# Patient Record
Sex: FEMALE | Race: WHITE | NOT HISPANIC OR LATINO | URBAN - METROPOLITAN AREA
[De-identification: names, ages, dates, MRNs, and addresses within clinical notes are randomized per-mention and may not be internally consistent; named-entity substitution may affect disease eponyms.]

---

## 2018-05-14 ENCOUNTER — IMPORTED ENCOUNTER (OUTPATIENT)
Dept: URBAN - METROPOLITAN AREA CLINIC 50 | Facility: CLINIC | Age: 48
End: 2018-05-14

## 2018-05-23 ENCOUNTER — IMPORTED ENCOUNTER (OUTPATIENT)
Dept: URBAN - METROPOLITAN AREA CLINIC 50 | Facility: CLINIC | Age: 48
End: 2018-05-23

## 2018-06-15 ENCOUNTER — IMPORTED ENCOUNTER (OUTPATIENT)
Dept: URBAN - METROPOLITAN AREA CLINIC 50 | Facility: CLINIC | Age: 48
End: 2018-06-15

## 2018-06-29 ENCOUNTER — IMPORTED ENCOUNTER (OUTPATIENT)
Dept: URBAN - METROPOLITAN AREA CLINIC 50 | Facility: CLINIC | Age: 48
End: 2018-06-29

## 2018-07-17 ENCOUNTER — IMPORTED ENCOUNTER (OUTPATIENT)
Dept: URBAN - METROPOLITAN AREA CLINIC 50 | Facility: CLINIC | Age: 48
End: 2018-07-17

## 2018-08-01 ENCOUNTER — IMPORTED ENCOUNTER (OUTPATIENT)
Dept: URBAN - METROPOLITAN AREA CLINIC 50 | Facility: CLINIC | Age: 48
End: 2018-08-01

## 2018-08-01 NOTE — PATIENT DISCUSSION
"""Pt. aware reading may be a little worse and knows will need +1.00 OTC reader over CTL'sTrials given to patient today. Patient to call with progress to finalize prescription.  Instructed patient to discontinue contact lens wear and call office immediately if pain/discomfort

## 2018-08-01 NOTE — PATIENT DISCUSSION
"""Pt. aware reading may be a little worse and knows will need +1.00 OTC reader over CTL'sPatient given final contact lens prescription.  8/1/2018"""

## 2018-08-17 ENCOUNTER — IMPORTED ENCOUNTER (OUTPATIENT)
Dept: URBAN - METROPOLITAN AREA CLINIC 50 | Facility: CLINIC | Age: 48
End: 2018-08-17

## 2018-12-07 ENCOUNTER — IMPORTED ENCOUNTER (OUTPATIENT)
Dept: URBAN - METROPOLITAN AREA CLINIC 50 | Facility: CLINIC | Age: 48
End: 2018-12-07

## 2018-12-07 NOTE — PATIENT DISCUSSION
"""CL check in Jan. Pt to work on getting dry eye under control before finalizing a new CL Rx. Discussed possible mini mono.  """

## 2019-01-31 ENCOUNTER — IMPORTED ENCOUNTER (OUTPATIENT)
Dept: URBAN - METROPOLITAN AREA CLINIC 50 | Facility: CLINIC | Age: 49
End: 2019-01-31

## 2020-04-22 ENCOUNTER — IMPORTED ENCOUNTER (OUTPATIENT)
Dept: URBAN - METROPOLITAN AREA CLINIC 50 | Facility: CLINIC | Age: 50
End: 2020-04-22

## 2020-06-05 ENCOUNTER — IMPORTED ENCOUNTER (OUTPATIENT)
Dept: URBAN - METROPOLITAN AREA CLINIC 50 | Facility: CLINIC | Age: 50
End: 2020-06-05

## 2020-06-05 NOTE — PATIENT DISCUSSION
"""Patient defered refraction and dilation.  Was unable to over refract due to patient having to leave ""

## 2020-06-30 ENCOUNTER — IMPORTED ENCOUNTER (OUTPATIENT)
Dept: URBAN - METROPOLITAN AREA CLINIC 50 | Facility: CLINIC | Age: 50
End: 2020-06-30

## 2020-07-08 ENCOUNTER — IMPORTED ENCOUNTER (OUTPATIENT)
Dept: URBAN - METROPOLITAN AREA CLINIC 50 | Facility: CLINIC | Age: 50
End: 2020-07-08

## 2021-05-14 ASSESSMENT — VISUAL ACUITY
OS_CC: 20/40
OD_CC: 20/20-1
OS_CC: 20/25-
OD_CC: 20/20
OS_CC: 20/20-
OS_CC: J3
OD_CC: J1+
OS_CC: 20/25
OS_CC: J1+
OS_PH: 20/20
OD_CC: J1(-)
OS_CC: 20/25
OD_CC: 20/50
OS_CC: J1+
OD_CC: 20/30
OS_CC: J1
OS_CC: 20/25-
OS_CC: 20/40
OD_CC: 20/20-1
OD_CC: J1
OS_CC: J1(-)
OD_CC: J1
OD_CC: J1+
OS_CC: J1 SLOW
OS_CC: 20/50
OD_CC: 20/20
OD_CC: 20/20
OD_CC: J1 SLOW
OD_CC: J3

## 2021-05-14 ASSESSMENT — TONOMETRY
OS_IOP_MMHG: 15
OD_IOP_MMHG: 16
OS_IOP_MMHG: 14
OD_IOP_MMHG: 14

## 2021-08-03 ENCOUNTER — PREPPED CHART (OUTPATIENT)
Dept: URBAN - METROPOLITAN AREA CLINIC 53 | Facility: CLINIC | Age: 51
End: 2021-08-03

## 2021-08-10 ENCOUNTER — ROUTINE EXAM (OUTPATIENT)
Dept: URBAN - METROPOLITAN AREA CLINIC 53 | Facility: CLINIC | Age: 51
End: 2021-08-10

## 2021-08-10 DIAGNOSIS — Z01.00: ICD-10-CM

## 2021-08-10 DIAGNOSIS — H52.4: ICD-10-CM

## 2021-08-10 PROCEDURE — 92014 COMPRE OPH EXAM EST PT 1/>: CPT

## 2021-08-10 PROCEDURE — CLF EW SOF CL FIT, EW, SOFT, MF/MONO

## 2021-08-10 PROCEDURE — 92015 DETERMINE REFRACTIVE STATE: CPT

## 2021-08-10 ASSESSMENT — VISUAL ACUITY
OS_CC: 20/30
OD_CC: 20/30+2
OD_CC: J1
OU_CC: 20/30+2
OU_CC: J1
OS_CC: J1

## 2021-08-10 ASSESSMENT — TONOMETRY
OS_IOP_MMHG: 16
OD_IOP_MMHG: 16

## 2021-08-10 NOTE — PATIENT DISCUSSION
Provided samples with the over refraction that she showed she needed. She is to call and let us know which one it is she prefers.

## 2022-10-25 ENCOUNTER — COMPREHENSIVE EXAM (OUTPATIENT)
Dept: URBAN - METROPOLITAN AREA CLINIC 52 | Facility: CLINIC | Age: 52
End: 2022-10-25

## 2022-10-25 DIAGNOSIS — Z01.01: ICD-10-CM

## 2022-10-25 DIAGNOSIS — H25.11: ICD-10-CM

## 2022-10-25 DIAGNOSIS — H25.812: ICD-10-CM

## 2022-10-25 DIAGNOSIS — H40.013: ICD-10-CM

## 2022-10-25 DIAGNOSIS — H52.4: ICD-10-CM

## 2022-10-25 PROCEDURE — 76514 ECHO EXAM OF EYE THICKNESS: CPT

## 2022-10-25 PROCEDURE — 92133 CPTRZD OPH DX IMG PST SGM ON: CPT

## 2022-10-25 PROCEDURE — 92014 COMPRE OPH EXAM EST PT 1/>: CPT

## 2022-10-25 PROCEDURE — 92015 DETERMINE REFRACTIVE STATE: CPT

## 2022-10-25 ASSESSMENT — KERATOMETRY
OD_K1POWER_DIOPTERS: 46.25
OS_K2POWER_DIOPTERS: 47.00
OD_AXISANGLE2_DEGREES: 111
OD_K2POWER_DIOPTERS: 47.00
OD_AXISANGLE_DEGREES: 021
OS_K1POWER_DIOPTERS: 46.00
OS_AXISANGLE2_DEGREES: 81
OS_AXISANGLE_DEGREES: 171

## 2022-10-25 ASSESSMENT — TONOMETRY
OS_IOP_MMHG: 17
OD_IOP_MMHG: 15
OS_IOP_MMHG: 16
OD_IOP_MMHG: 16

## 2022-10-25 ASSESSMENT — VISUAL ACUITY
OD_GLARE: 20/20
OD_GLARE: 20/20
OU_CC: 20/20
OD_CC: 20/25
OS_GLARE: 20/30
OS_CC: 20/50-1
OS_GLARE: 20/25

## 2022-10-25 NOTE — PATIENT DISCUSSION
Plan B is to provide patient trial of distance only and let her wear her readers over lenses. As she currently wear readers over her MF lenses.

## 2022-10-25 NOTE — PATIENT DISCUSSION
Advised no swimming, sleeping, or showering in lenses. Do not over wear lenses. CL solution should be replaced daily and should never be topped off. CL case should be replaced every 3 months, and should never touch tap water. If pain or irritation occur while wearing lenses, discontinue wearing lenses and call our office.

## 2022-10-25 NOTE — PATIENT DISCUSSION
IOP 16/17 today. Due to large c/d obtained OCT (RNFL) and Pachymetry today. Went over RNFL with patient. Will schedule patient for HVF testing next available. Will see patient back in 6 months for IOP check and HVF 24-2 OFELIA standard and OCT (RNFL).

## 2022-12-07 ENCOUNTER — DIAGNOSTICS ONLY (OUTPATIENT)
Dept: URBAN - METROPOLITAN AREA CLINIC 52 | Facility: CLINIC | Age: 52
End: 2022-12-07

## 2022-12-07 PROCEDURE — 92083 EXTENDED VISUAL FIELD XM: CPT

## 2022-12-07 ASSESSMENT — KERATOMETRY
OS_K2POWER_DIOPTERS: 47.00
OS_K1POWER_DIOPTERS: 46.00
OD_K1POWER_DIOPTERS: 46.25
OS_AXISANGLE_DEGREES: 171
OD_K2POWER_DIOPTERS: 47.00
OD_AXISANGLE2_DEGREES: 111
OD_AXISANGLE_DEGREES: 021
OS_AXISANGLE2_DEGREES: 81

## 2024-01-05 ENCOUNTER — COMPREHENSIVE EXAM (OUTPATIENT)
Dept: URBAN - METROPOLITAN AREA CLINIC 50 | Facility: LOCATION | Age: 54
End: 2024-01-05

## 2024-01-05 DIAGNOSIS — H40.013: ICD-10-CM

## 2024-01-05 DIAGNOSIS — H52.4: ICD-10-CM

## 2024-01-05 PROCEDURE — 92133 CPTRZD OPH DX IMG PST SGM ON: CPT

## 2024-01-05 PROCEDURE — 92083 EXTENDED VISUAL FIELD XM: CPT

## 2024-01-05 PROCEDURE — 92310-3E ESTABLISHED CL PATIENT MULTIFOCAL AND/OR MONOVISION SOFT LENS EVALUATION

## 2024-01-05 PROCEDURE — 92014 COMPRE OPH EXAM EST PT 1/>: CPT

## 2024-01-05 PROCEDURE — 92015 DETERMINE REFRACTIVE STATE: CPT

## 2024-01-05 ASSESSMENT — TONOMETRY
OS_IOP_MMHG: 16
OD_IOP_MMHG: 16
OS_IOP_MMHG: 15
OD_IOP_MMHG: 15

## 2024-01-05 ASSESSMENT — VISUAL ACUITY
OU_CC: J1+@16"
OU_CC: 20/20
OD_CC: 20/20
OS_CC: 20/25-2

## 2024-01-05 ASSESSMENT — KERATOMETRY
OS_K1POWER_DIOPTERS: 46.00
OD_AXISANGLE_DEGREES: 21
OD_AXISANGLE2_DEGREES: 111
OS_K2POWER_DIOPTERS: 47.00
OS_AXISANGLE_DEGREES: 173
OD_K1POWER_DIOPTERS: 46.50
OD_K2POWER_DIOPTERS: 47.25
OS_AXISANGLE2_DEGREES: 83

## 2025-03-19 ENCOUNTER — COMPREHENSIVE EXAM (OUTPATIENT)
Age: 55
End: 2025-03-19

## 2025-03-19 DIAGNOSIS — Z97.3: ICD-10-CM

## 2025-03-19 DIAGNOSIS — H40.013: ICD-10-CM

## 2025-03-19 DIAGNOSIS — H52.4: ICD-10-CM

## 2025-03-19 DIAGNOSIS — Z01.01: ICD-10-CM

## 2025-03-19 PROCEDURE — 92310-3 LEVEL 3 SOFT LENS UPDATE

## 2025-03-19 PROCEDURE — 92015 DETERMINE REFRACTIVE STATE: CPT

## 2025-03-19 PROCEDURE — 92014 COMPRE OPH EXAM EST PT 1/>: CPT
